# Patient Record
Sex: FEMALE | Race: WHITE | NOT HISPANIC OR LATINO | Employment: UNEMPLOYED | URBAN - NONMETROPOLITAN AREA
[De-identification: names, ages, dates, MRNs, and addresses within clinical notes are randomized per-mention and may not be internally consistent; named-entity substitution may affect disease eponyms.]

---

## 2020-01-13 ENCOUNTER — OFFICE VISIT (OUTPATIENT)
Dept: CARDIOLOGY | Facility: CLINIC | Age: 50
End: 2020-01-13

## 2020-01-13 VITALS
SYSTOLIC BLOOD PRESSURE: 124 MMHG | OXYGEN SATURATION: 99 % | BODY MASS INDEX: 19.62 KG/M2 | HEIGHT: 67 IN | DIASTOLIC BLOOD PRESSURE: 82 MMHG | HEART RATE: 74 BPM | WEIGHT: 125 LBS

## 2020-01-13 DIAGNOSIS — R55 SYNCOPE AND COLLAPSE: Primary | ICD-10-CM

## 2020-01-13 DIAGNOSIS — E78.5 DYSLIPIDEMIA: ICD-10-CM

## 2020-01-13 DIAGNOSIS — R94.31 ABNORMAL EKG: ICD-10-CM

## 2020-01-13 DIAGNOSIS — F41.9 ANXIETY: ICD-10-CM

## 2020-01-13 PROBLEM — K21.9 GERD (GASTROESOPHAGEAL REFLUX DISEASE): Status: ACTIVE | Noted: 2020-01-13

## 2020-01-13 PROCEDURE — 93000 ELECTROCARDIOGRAM COMPLETE: CPT | Performed by: INTERNAL MEDICINE

## 2020-01-13 PROCEDURE — 99204 OFFICE O/P NEW MOD 45 MIN: CPT | Performed by: INTERNAL MEDICINE

## 2020-01-13 RX ORDER — ATORVASTATIN CALCIUM 10 MG/1
10 TABLET, FILM COATED ORAL DAILY
COMMUNITY

## 2020-01-13 RX ORDER — UREA 10 %
LOTION (ML) TOPICAL
COMMUNITY

## 2020-01-13 RX ORDER — TRAZODONE HYDROCHLORIDE 150 MG/1
150 TABLET ORAL NIGHTLY
COMMUNITY

## 2020-01-13 RX ORDER — NITROGLYCERIN 0.4 MG/1
0.4 TABLET SUBLINGUAL
COMMUNITY
End: 2020-01-13

## 2020-01-13 RX ORDER — PANTOPRAZOLE SODIUM 20 MG/1
20 TABLET, DELAYED RELEASE ORAL DAILY
COMMUNITY

## 2020-01-13 RX ORDER — FOLIC ACID 1 MG/1
1 TABLET ORAL DAILY
COMMUNITY

## 2020-01-13 RX ORDER — ASPIRIN 81 MG/1
81 TABLET ORAL DAILY
COMMUNITY

## 2020-01-13 NOTE — PROGRESS NOTES
Reason for Visit: syncope.    HPI:  Glo Hernandez is a 49 y.o. female is here today for consultation at the request of Dr. Presley for evaluation of syncope.  She has not had any previous cardiac testing.  She denies any history of cardiac problems.  She reports she was standing and walking back to the living quarters.  She reports she has been up for about 2 minutes.  She then lost consciousness.  She reports she had a similar episode about 7 months ago after standing up.  She was taken to the [facility] and evaluated by the physician.  There was concern for possible EKG changes and she was referred to cardiology.  She denies any chest pain, palpitations, dizziness, syncope, PND, or orthopnea.  She has no problem with any of her activities of daily living.  She is hoping to go back to regular working schedule.  She notes anxiety has been an issue for her and she thinks this might of been part of her problem.    Previous Cardiac Testing and Procedures:  - none    Patient Active Problem List   Diagnosis   • Dyslipidemia   • Anxiety   • GERD (gastroesophageal reflux disease)       Social History     Tobacco Use   • Smoking status: Former Smoker   • Smokeless tobacco: Never Used   Substance Use Topics   • Alcohol use: Not Currently   • Drug use: Not Currently       History reviewed. No pertinent family history.    The following portions of the patient's history were reviewed and updated as appropriate: allergies, current medications, past family history, past medical history, past social history, past surgical history and problem list.      Current Outpatient Medications:   •  aspirin 81 MG EC tablet, Take 81 mg by mouth Daily., Disp: , Rfl:   •  atorvastatin (LIPITOR) 10 MG tablet, Take 10 mg by mouth Daily., Disp: , Rfl:   •  folic acid (FOLVITE) 1 MG tablet, Take 1 mg by mouth Daily., Disp: , Rfl:   •  melatonin 1 MG tablet, Take  by mouth., Disp: , Rfl:   •  pantoprazole (PROTONIX) 20 MG EC tablet, Take 20 mg by  "mouth Daily., Disp: , Rfl:   •  traZODone (DESYREL) 150 MG tablet, Take 150 mg by mouth Every Night., Disp: , Rfl:     Review of Systems   Constitution: Negative for chills, fever and weight loss.   HENT: Negative for sore throat.    Eyes: Negative for blurred vision and visual disturbance.   Cardiovascular: Positive for syncope. Negative for chest pain, dyspnea on exertion, leg swelling, palpitations and paroxysmal nocturnal dyspnea.   Respiratory: Negative for cough and shortness of breath.    Endocrine: Negative for cold intolerance and polyuria.   Skin: Negative for itching and rash.   Musculoskeletal: Negative for joint swelling and myalgias.   Gastrointestinal: Negative for abdominal pain, diarrhea and vomiting.   Genitourinary: Negative for dysuria and hematuria.   Neurological: Negative for dizziness, headaches and numbness.   Psychiatric/Behavioral: Negative for depression. The patient is not nervous/anxious.    Allergic/Immunologic: Negative for hives.       Objective   /82 (BP Location: Left arm, Patient Position: Sitting, Cuff Size: Adult)   Pulse 74   Ht 170.2 cm (67\")   Wt 56.7 kg (125 lb)   SpO2 99%   BMI 19.58 kg/m²   Physical Exam   Constitutional: She is oriented to person, place, and time. She appears well-developed and well-nourished.   HENT:   Head: Normocephalic and atraumatic.   Eyes: Pupils are equal, round, and reactive to light. Conjunctivae and EOM are normal.   Neck: Normal range of motion. Neck supple. No JVD present. No thyromegaly present.   Cardiovascular: Normal rate, regular rhythm and normal heart sounds.   No murmur heard.  Pulmonary/Chest: Effort normal and breath sounds normal. She has no wheezes. She has no rales.   Abdominal: Soft. Bowel sounds are normal. She exhibits no distension. There is no tenderness.   Musculoskeletal: Normal range of motion. She exhibits no edema.   Neurological: She is alert and oriented to person, place, and time. Coordination normal. "   Skin: Skin is warm and dry. No rash noted.   Psychiatric: She has a normal mood and affect. Her behavior is normal.       ECG 12 Lead  Date/Time: 1/13/2020 8:56 AM  Performed by: Julius Barboza MD  Authorized by: Julius Barboza MD   Comparison: compared with previous ECG from 11/27/2019  Similar to previous ECG  Rhythm: sinus rhythm  Rate: normal    Clinical impression: normal ECG              ICD-10-CM ICD-9-CM   1. Syncope and collapse R55 780.2   2. Abnormal EKG R94.31 794.31   3. Dyslipidemia E78.5 272.4   4. Anxiety F41.9 300.00         Assessment/Plan:  1.  Syncope: Primary differential includes vasovagal and orthostasis.  Of her episodes occurred while standing.  She has no other significant cardiac symptoms.  Will evaluate further with a Holter Monitor.  Okay to return to regular work schedule without restrictions.    2.  Abnormal EKG: Previous EKGs were reviewed.  Nonspecific changes noted.  No significant ischemic changes were visualized.  Normal sinus rhythm, normal EKG today.  Patient denies any chest pain or dyspnea on exertion.  Offer reassurance.    3.  Dyslipidemia: Managed on atorvastatin.    4.  Anxiety: Possibly contributing to syncope.

## 2020-01-31 ENCOUNTER — TELEPHONE (OUTPATIENT)
Dept: CARDIOLOGY | Facility: CLINIC | Age: 50
End: 2020-01-31

## 2020-01-31 NOTE — TELEPHONE ENCOUNTER
----- Message from Julius Barboza MD sent at 1/29/2020  4:13 PM CST -----  Please let her know that the Holter monitor was unremarkable and did not show any evidence of any significant dysrhythmias.